# Patient Record
Sex: FEMALE | Race: WHITE | NOT HISPANIC OR LATINO | URBAN - METROPOLITAN AREA
[De-identification: names, ages, dates, MRNs, and addresses within clinical notes are randomized per-mention and may not be internally consistent; named-entity substitution may affect disease eponyms.]

---

## 2020-10-12 ENCOUNTER — INPATIENT (INPATIENT)
Facility: HOSPITAL | Age: 30
LOS: 1 days | Discharge: HOME | End: 2020-10-14
Attending: OBSTETRICS & GYNECOLOGY | Admitting: OBSTETRICS & GYNECOLOGY
Payer: COMMERCIAL

## 2020-10-12 VITALS — HEIGHT: 66 IN | WEIGHT: 189.6 LBS

## 2020-10-12 LAB
BASOPHILS # BLD AUTO: 0.04 K/UL — SIGNIFICANT CHANGE UP (ref 0–0.2)
BASOPHILS NFR BLD AUTO: 0.4 % — SIGNIFICANT CHANGE UP (ref 0–2)
EOSINOPHIL # BLD AUTO: 0.1 K/UL — SIGNIFICANT CHANGE UP (ref 0–0.5)
EOSINOPHIL NFR BLD AUTO: 1 % — SIGNIFICANT CHANGE UP (ref 0–6)
HCT VFR BLD CALC: 34.5 % — SIGNIFICANT CHANGE UP (ref 34.5–45)
HGB BLD-MCNC: 11.1 G/DL — LOW (ref 11.5–15.5)
IMM GRANULOCYTES NFR BLD AUTO: 1.7 % — HIGH (ref 0–1.5)
LYMPHOCYTES # BLD AUTO: 1.74 K/UL — SIGNIFICANT CHANGE UP (ref 1–3.3)
LYMPHOCYTES # BLD AUTO: 16.7 % — SIGNIFICANT CHANGE UP (ref 13–44)
MCHC RBC-ENTMCNC: 30 PG — SIGNIFICANT CHANGE UP (ref 27–34)
MCHC RBC-ENTMCNC: 32.2 GM/DL — SIGNIFICANT CHANGE UP (ref 32–36)
MCV RBC AUTO: 93.2 FL — SIGNIFICANT CHANGE UP (ref 80–100)
MONOCYTES # BLD AUTO: 0.99 K/UL — HIGH (ref 0–0.9)
MONOCYTES NFR BLD AUTO: 9.5 % — SIGNIFICANT CHANGE UP (ref 2–14)
NEUTROPHILS # BLD AUTO: 7.38 K/UL — SIGNIFICANT CHANGE UP (ref 1.8–7.4)
NEUTROPHILS NFR BLD AUTO: 70.7 % — SIGNIFICANT CHANGE UP (ref 43–77)
NRBC # BLD: 0 /100 WBCS — SIGNIFICANT CHANGE UP (ref 0–0)
PLATELET # BLD AUTO: 193 K/UL — SIGNIFICANT CHANGE UP (ref 150–400)
RBC # BLD: 3.7 M/UL — LOW (ref 3.8–5.2)
RBC # FLD: 14.9 % — HIGH (ref 10.3–14.5)
T PALLIDUM AB TITR SER: NEGATIVE — SIGNIFICANT CHANGE UP
WBC # BLD: 10.43 K/UL — SIGNIFICANT CHANGE UP (ref 3.8–10.5)
WBC # FLD AUTO: 10.43 K/UL — SIGNIFICANT CHANGE UP (ref 3.8–10.5)

## 2020-10-12 RX ORDER — FENTANYL/BUPIVACAINE/NS/PF 2MCG/ML-.1
250 PLASTIC BAG, INJECTION (ML) INJECTION
Refills: 0 | Status: DISCONTINUED | OUTPATIENT
Start: 2020-10-12 | End: 2020-10-13

## 2020-10-12 RX ORDER — OXYTOCIN 10 UNIT/ML
1 VIAL (ML) INJECTION
Qty: 30 | Refills: 0 | Status: DISCONTINUED | OUTPATIENT
Start: 2020-10-12 | End: 2020-10-13

## 2020-10-12 RX ORDER — SODIUM CHLORIDE 9 MG/ML
1000 INJECTION, SOLUTION INTRAVENOUS
Refills: 0 | Status: DISCONTINUED | OUTPATIENT
Start: 2020-10-12 | End: 2020-10-13

## 2020-10-12 RX ORDER — OXYTOCIN 10 UNIT/ML
333.33 VIAL (ML) INJECTION
Qty: 20 | Refills: 0 | Status: DISCONTINUED | OUTPATIENT
Start: 2020-10-12 | End: 2020-10-13

## 2020-10-12 RX ADMIN — SODIUM CHLORIDE 125 MILLILITER(S): 9 INJECTION, SOLUTION INTRAVENOUS at 07:50

## 2020-10-12 RX ADMIN — Medication 250 MILLILITER(S): at 16:00

## 2020-10-12 RX ADMIN — Medication 1 MILLIUNIT(S)/MIN: at 14:31

## 2020-10-12 RX ADMIN — SODIUM CHLORIDE 125 MILLILITER(S): 9 INJECTION, SOLUTION INTRAVENOUS at 22:46

## 2020-10-12 RX ADMIN — SODIUM CHLORIDE 125 MILLILITER(S): 9 INJECTION, SOLUTION INTRAVENOUS at 15:34

## 2020-10-12 RX ADMIN — Medication 1 MILLIUNIT(S)/MIN: at 14:30

## 2020-10-13 LAB
SARS-COV-2 IGG SERPL QL IA: NEGATIVE — SIGNIFICANT CHANGE UP
SARS-COV-2 IGM SERPL IA-ACNC: <0.1 INDEX — SIGNIFICANT CHANGE UP

## 2020-10-13 PROCEDURE — 88307 TISSUE EXAM BY PATHOLOGIST: CPT | Mod: 26

## 2020-10-13 RX ORDER — DIBUCAINE 1 %
1 OINTMENT (GRAM) RECTAL EVERY 6 HOURS
Refills: 0 | Status: DISCONTINUED | OUTPATIENT
Start: 2020-10-13 | End: 2020-10-14

## 2020-10-13 RX ORDER — TETANUS TOXOID, REDUCED DIPHTHERIA TOXOID AND ACELLULAR PERTUSSIS VACCINE, ADSORBED 5; 2.5; 8; 8; 2.5 [IU]/.5ML; [IU]/.5ML; UG/.5ML; UG/.5ML; UG/.5ML
0.5 SUSPENSION INTRAMUSCULAR ONCE
Refills: 0 | Status: DISCONTINUED | OUTPATIENT
Start: 2020-10-13 | End: 2020-10-14

## 2020-10-13 RX ORDER — SODIUM CHLORIDE 9 MG/ML
3 INJECTION INTRAMUSCULAR; INTRAVENOUS; SUBCUTANEOUS EVERY 8 HOURS
Refills: 0 | Status: DISCONTINUED | OUTPATIENT
Start: 2020-10-13 | End: 2020-10-14

## 2020-10-13 RX ORDER — PRAMOXINE HYDROCHLORIDE 150 MG/15G
1 AEROSOL, FOAM RECTAL EVERY 4 HOURS
Refills: 0 | Status: DISCONTINUED | OUTPATIENT
Start: 2020-10-13 | End: 2020-10-14

## 2020-10-13 RX ORDER — SIMETHICONE 80 MG/1
80 TABLET, CHEWABLE ORAL EVERY 4 HOURS
Refills: 0 | Status: DISCONTINUED | OUTPATIENT
Start: 2020-10-13 | End: 2020-10-14

## 2020-10-13 RX ORDER — DIPHENHYDRAMINE HCL 50 MG
25 CAPSULE ORAL EVERY 6 HOURS
Refills: 0 | Status: DISCONTINUED | OUTPATIENT
Start: 2020-10-13 | End: 2020-10-14

## 2020-10-13 RX ORDER — OXYCODONE HYDROCHLORIDE 5 MG/1
5 TABLET ORAL ONCE
Refills: 0 | Status: DISCONTINUED | OUTPATIENT
Start: 2020-10-13 | End: 2020-10-14

## 2020-10-13 RX ORDER — MAGNESIUM HYDROXIDE 400 MG/1
30 TABLET, CHEWABLE ORAL
Refills: 0 | Status: DISCONTINUED | OUTPATIENT
Start: 2020-10-13 | End: 2020-10-14

## 2020-10-13 RX ORDER — KETOROLAC TROMETHAMINE 30 MG/ML
30 SYRINGE (ML) INJECTION ONCE
Refills: 0 | Status: DISCONTINUED | OUTPATIENT
Start: 2020-10-13 | End: 2020-10-13

## 2020-10-13 RX ORDER — HYDROCORTISONE 1 %
1 OINTMENT (GRAM) TOPICAL EVERY 6 HOURS
Refills: 0 | Status: DISCONTINUED | OUTPATIENT
Start: 2020-10-13 | End: 2020-10-14

## 2020-10-13 RX ORDER — OXYTOCIN 10 UNIT/ML
333.33 VIAL (ML) INJECTION
Qty: 20 | Refills: 0 | Status: DISCONTINUED | OUTPATIENT
Start: 2020-10-13 | End: 2020-10-14

## 2020-10-13 RX ORDER — IBUPROFEN 200 MG
600 TABLET ORAL EVERY 6 HOURS
Refills: 0 | Status: DISCONTINUED | OUTPATIENT
Start: 2020-10-13 | End: 2020-10-14

## 2020-10-13 RX ORDER — ACETAMINOPHEN 500 MG
975 TABLET ORAL
Refills: 0 | Status: DISCONTINUED | OUTPATIENT
Start: 2020-10-13 | End: 2020-10-14

## 2020-10-13 RX ORDER — AER TRAVELER 0.5 G/1
1 SOLUTION RECTAL; TOPICAL EVERY 4 HOURS
Refills: 0 | Status: DISCONTINUED | OUTPATIENT
Start: 2020-10-13 | End: 2020-10-14

## 2020-10-13 RX ORDER — BENZOCAINE 10 %
1 GEL (GRAM) MUCOUS MEMBRANE EVERY 6 HOURS
Refills: 0 | Status: DISCONTINUED | OUTPATIENT
Start: 2020-10-13 | End: 2020-10-14

## 2020-10-13 RX ORDER — IBUPROFEN 200 MG
600 TABLET ORAL EVERY 6 HOURS
Refills: 0 | Status: COMPLETED | OUTPATIENT
Start: 2020-10-13 | End: 2021-09-11

## 2020-10-13 RX ORDER — OXYCODONE HYDROCHLORIDE 5 MG/1
5 TABLET ORAL
Refills: 0 | Status: DISCONTINUED | OUTPATIENT
Start: 2020-10-13 | End: 2020-10-14

## 2020-10-13 RX ORDER — LANOLIN
1 OINTMENT (GRAM) TOPICAL EVERY 6 HOURS
Refills: 0 | Status: DISCONTINUED | OUTPATIENT
Start: 2020-10-13 | End: 2020-10-14

## 2020-10-13 RX ADMIN — Medication 600 MILLIGRAM(S): at 13:32

## 2020-10-13 RX ADMIN — Medication 30 MILLIGRAM(S): at 07:24

## 2020-10-13 RX ADMIN — SODIUM CHLORIDE 3 MILLILITER(S): 9 INJECTION INTRAMUSCULAR; INTRAVENOUS; SUBCUTANEOUS at 22:00

## 2020-10-13 RX ADMIN — Medication 600 MILLIGRAM(S): at 12:51

## 2020-10-13 RX ADMIN — Medication 30 MILLIGRAM(S): at 08:18

## 2020-10-13 RX ADMIN — SODIUM CHLORIDE 3 MILLILITER(S): 9 INJECTION INTRAMUSCULAR; INTRAVENOUS; SUBCUTANEOUS at 14:11

## 2020-10-13 RX ADMIN — Medication 600 MILLIGRAM(S): at 19:32

## 2020-10-13 RX ADMIN — Medication 1 TABLET(S): at 12:51

## 2020-10-13 RX ADMIN — Medication 600 MILLIGRAM(S): at 18:18

## 2020-10-13 RX ADMIN — Medication 1000 MILLIUNIT(S)/MIN: at 07:24

## 2020-10-13 NOTE — LACTATION INITIAL EVALUATION - LACTATION INTERVENTIONS
initiate hand expression routine/initiate skin to skin/and use of hand pump if unable to hand express,  discussed with pt. possibility of supplementing with formula if unable to express colostrum and baby unable to sustain sucking.

## 2020-10-13 NOTE — LACTATION INITIAL EVALUATION - LATCH
normal latch/shallow latch/able to achieve a deep latch briefly for approx. 3 minutes and then slipped off

## 2020-10-13 NOTE — LACTATION INITIAL EVALUATION - NS LACT CON REASON FOR REQ
Pt. is a P1 at 39.5 wks.gestation via vaginal delivery.  Pt. denies medical problems during the pregnancy.  Baby is less than 24 hrs. old (12 hrs. old) has had 1 void and due to mec.  Discusse jihan pt. to encourage as much skin tos kin during the first 24 hrs., observe for marissa feeding cues, attempt to breastfeed whern baby shows fdeeding cues, if baby is not waking or showing feeding cues three hrs from last attempt pt. should hand express or hand pump to obtain colostrum and then finger feed or syringe feed to baby.  Discussed possibility of supplementing with formula if unable to collect colostrum to feed to baby.  Instructed pt. on use of hand pump and demonstrated hand expression to pt./primaparous mom primaparous mom/Pt. is a P1 at 39.5 wks. gestation via vaginal delivery.  Pt. denies medical problems during the pregnancy.  Baby is less than 24 hrs. old (12 hrs. old) has had 1 void and due to mec.  Discussed with pt. to encourage as much skin to skin during the first 24 hrs., observe for early feeding cues, attempt to breastfeed when baby shows feeding cues, if baby is not waking or showing feeding cues three hrs from last attempt pt. should hand express or hand pump to obtain colostrum and then finger feed or syringe feed to baby.  Discussed possibility of supplementing with formula if unable to collect colostrum to feed to baby.  Instructed pt. on use of hand pump and demonstrated hand expression to pt.

## 2020-10-14 VITALS
TEMPERATURE: 98 F | HEART RATE: 80 BPM | OXYGEN SATURATION: 97 % | RESPIRATION RATE: 18 BRPM | SYSTOLIC BLOOD PRESSURE: 96 MMHG | DIASTOLIC BLOOD PRESSURE: 55 MMHG

## 2020-10-14 PROCEDURE — 86850 RBC ANTIBODY SCREEN: CPT

## 2020-10-14 PROCEDURE — 36415 COLL VENOUS BLD VENIPUNCTURE: CPT

## 2020-10-14 PROCEDURE — 86769 SARS-COV-2 COVID-19 ANTIBODY: CPT

## 2020-10-14 PROCEDURE — 88307 TISSUE EXAM BY PATHOLOGIST: CPT

## 2020-10-14 PROCEDURE — 86900 BLOOD TYPING SEROLOGIC ABO: CPT

## 2020-10-14 PROCEDURE — 85025 COMPLETE CBC W/AUTO DIFF WBC: CPT

## 2020-10-14 PROCEDURE — 86780 TREPONEMA PALLIDUM: CPT

## 2020-10-14 PROCEDURE — 86901 BLOOD TYPING SEROLOGIC RH(D): CPT

## 2020-10-14 RX ADMIN — Medication 600 MILLIGRAM(S): at 12:01

## 2020-10-14 RX ADMIN — Medication 600 MILLIGRAM(S): at 02:52

## 2020-10-14 RX ADMIN — Medication 1 TABLET(S): at 12:01

## 2020-10-14 RX ADMIN — Medication 600 MILLIGRAM(S): at 01:50

## 2020-10-14 RX ADMIN — Medication 975 MILLIGRAM(S): at 16:07

## 2020-10-14 RX ADMIN — Medication 975 MILLIGRAM(S): at 10:09

## 2020-10-14 RX ADMIN — Medication 975 MILLIGRAM(S): at 09:06

## 2020-10-14 RX ADMIN — Medication 975 MILLIGRAM(S): at 15:00

## 2020-10-14 RX ADMIN — Medication 600 MILLIGRAM(S): at 06:30

## 2020-10-14 RX ADMIN — Medication 600 MILLIGRAM(S): at 05:00

## 2020-10-14 RX ADMIN — Medication 600 MILLIGRAM(S): at 13:00

## 2020-10-14 NOTE — DISCHARGE NOTE OB - PATIENT PORTAL LINK FT
You can access the FollowMyHealth Patient Portal offered by [facility] by registering at the following website: [email or web address]. By joining [facility]’s FollowMyHealth portal, you will also be able to view your health information using other applications (apps) compatible with our system.

## 2020-10-14 NOTE — PROGRESS NOTE ADULT - ASSESSMENT
Assessment/Plan    30y y.o. now PPD#1 s/p . AfVSS. Patient is progressing toward all postpartum milestones. Pain is well-controlled on PO medications. Anticipate discharge today or tomorrow.    1. Pain  - Well-controlled on Motrin and Tylenol ATC    2. GI  - Tolerating regular diet without n/v  - Senna PRN    3.   - Voiding spontaneously without difficulty/pain    4. DVT prophylaxis  - Encouraging ambulation    5. Dispo  - Anticipate dispo PPD#1        Nancy Fitzgerald MD PGY1

## 2020-10-14 NOTE — CHART NOTE - NSCHARTNOTEFT_GEN_A_CORE
Called to bedside due to uneven leg swelling per nursing. At bedside, patient is comfortable and walking around. Endorses R ankle swelling. Denies SOB, pain. On exam, both calves symmetric and 45cm in circumference. R ankle and foot swollen. Bilateral calves, thighs, and ankles non-tender. Negative Joshua's sign. Discussed with attending Theil. Patient ok to discharge to home given asymptomatic.    Nancy Fitzgerald MD PGY1  DWMAGAN Almanzar PGY3  ALEXANDRA Theil

## 2020-10-14 NOTE — PROGRESS NOTE ADULT - SUBJECTIVE AND OBJECTIVE BOX
Patient is a 30y y.o. F now PPD #1 s/p .    NAEO. Patient was evaluated at bedside this AM. Pain is well-controlled with PO pain medications. She has been ambulating without difficulty and voiding spontaneously. She endorses decreasing vaginal bleeding.    Vital Signs Last 24 Hrs  T(C): 36.7 (14 Oct 2020 05:22), Max: 37.1 (13 Oct 2020 09:30)  T(F): 98.1 (14 Oct 2020 05:22), Max: 98.8 (13 Oct 2020 09:30)  HR: 70 (14 Oct 2020 05:22) (70 - 92)  BP: 97/65 (14 Oct 2020 05:22) (97/65 - 123/78)  BP(mean): 70 (13 Oct 2020 08:00) (70 - 85)  RR: 17 (14 Oct 2020 05:22) (15 - 20)  SpO2: 97% (14 Oct 2020 05:22) (96% - 100%)    Physical Exam  Gen: Well-appearing. No acute distress. Resting comfortably in bed.  Resp: Breathing comfortably on RA.  Abd: Soft, non-tender, non-distended. Uterus firm at umbilicus.  Extremities: No calf tenderness.    Labs                          11.1   10.43 )-----------( 193      ( 12 Oct 2020 07:58 )             34.5                   10-12-20 @ 07:  -  10-13-20 @ 07:00  --------------------------------------------------------  IN: 3022 mL / OUT: 920 mL / NET: 2102 mL    10-13-20 @ 07:01  -  10-14-20 @ 06:24  --------------------------------------------------------  IN: 0 mL / OUT: 1500 mL / NET: -1500 mL

## 2020-10-14 NOTE — DISCHARGE NOTE OB - HOSPITAL COURSE
uncomplicated vaginal delivery and routine post partum recovery meeting all milestones appropriately

## 2020-10-14 NOTE — DISCHARGE NOTE OB - CARE PROVIDER_API CALL
Hillary Porter)  Obstetrics and Gynecology  [address] [place] [id number]  Phone: [phone]  Fax: [phone]  Follow Up Time:

## 2020-10-19 DIAGNOSIS — Z3A.39 39 WEEKS GESTATION OF PREGNANCY: ICD-10-CM

## 2020-10-19 DIAGNOSIS — Z28.09 IMMUNIZATION NOT CARRIED OUT BECAUSE OF OTHER CONTRAINDICATION: ICD-10-CM

## 2020-10-19 DIAGNOSIS — Z34.80 ENCOUNTER FOR SUPERVISION OF OTHER NORMAL PREGNANCY, UNSPECIFIED TRIMESTER: ICD-10-CM

## 2020-11-02 LAB — SURGICAL PATHOLOGY STUDY: SIGNIFICANT CHANGE UP
